# Patient Record
Sex: MALE | Race: WHITE | NOT HISPANIC OR LATINO | URBAN - METROPOLITAN AREA
[De-identification: names, ages, dates, MRNs, and addresses within clinical notes are randomized per-mention and may not be internally consistent; named-entity substitution may affect disease eponyms.]

---

## 2020-07-02 ENCOUNTER — OFFICE VISIT (OUTPATIENT)
Dept: URBAN - METROPOLITAN AREA TELEHEALTH 2 | Facility: TELEHEALTH | Age: 75
End: 2020-07-02
Payer: MEDICARE

## 2020-07-02 DIAGNOSIS — Z86.010 HISTORY OF COLON POLYPS: ICD-10-CM

## 2020-07-02 PROCEDURE — 993 PRE-COLON SCREENING (NON-BILLABLE): Performed by: INTERNAL MEDICINE

## 2020-07-02 PROCEDURE — 99242 OFF/OP CONSLTJ NEW/EST SF 20: CPT | Performed by: INTERNAL MEDICINE

## 2020-07-02 RX ORDER — ATORVASTATIN CALCIUM 80 MG/1
1 TABLET TABLET, FILM COATED ORAL ONCE A DAY
Status: ACTIVE | COMMUNITY

## 2020-07-02 RX ORDER — MELOXICAM 7.5 MG/1
TAKE 1 TABLET (7.5 MG) BY ORAL ROUTE ONCE DAILY TABLET ORAL 1
Qty: 0 | Refills: 0 | Status: ACTIVE | COMMUNITY
Start: 1900-01-01

## 2020-07-02 RX ORDER — METOPROLOL SUCCINATE 25 MG/1
1 TABLET TABLET, FILM COATED, EXTENDED RELEASE ORAL ONCE A DAY
Status: ACTIVE | COMMUNITY

## 2020-07-02 RX ORDER — SODIUM, POTASSIUM,MAG SULFATES 17.5-3.13G
254 ML SOLUTION, RECONSTITUTED, ORAL ORAL ONCE
Qty: 1 | Refills: 0 | OUTPATIENT
Start: 2020-07-04 | End: 2020-07-05

## 2020-07-02 RX ORDER — ALFUZOSIN HYDROCHLORIDE 10 MG/1
TAKE 1 TABLET (10 MG) BY ORAL ROUTE ONCE DAILY TABLET, EXTENDED RELEASE ORAL 1
Qty: 0 | Refills: 0 | Status: ACTIVE | COMMUNITY
Start: 1900-01-01

## 2020-07-02 RX ORDER — RAMIPRIL 5 MG/1
1 CAPSULE CAPSULE ORAL ONCE A DAY
Status: ACTIVE | COMMUNITY

## 2020-07-02 NOTE — HPI-TODAY'S VISIT:
12/8/16: The patient was asked to start iron prior to shoulder surgery planned for next week just to get his blood counts up but not due to anemia or iron deficiency.  When he started the oral iron he had worsening of chronic constipation and narrowing of the stools.  Since stopping the iron his stools are now moving better and are no longer dark.  He does take prunes and Miralax daily to go.  He denies abdominal pains, weight loss, night sweats, nausea or vomiting, rectal bleeding. The patient has a history of mutiple abdominal surgeries including AAA and anterior abdominal hernia repair x 4 (complicated by infection requiring drainage and later placement of nearly 12 inch square patch on the front of his abdomen). Four or five years ago he had an small bowel obstruction treated medically at [facility] due to adhesions.  June 2016 he had a RLQ colonic blockage treated medically. No old records are available for my review today. Records requested. About 18 months ago he had a colonoscopy in [facility] showing polyps that were removed and he was told to get another one in 2 years.  He reports multiple colonoscopies showing polyps in the past.  He has a FH of colon cancer in his father and sister. Patient had a normal stress test at [facility] recently.  7/2/20: Patient presents for follow-up of his colon polyps.  He is due for surveillance at this time.  No complaints.  Spent a while trying to update his medication list.

## 2020-07-04 ENCOUNTER — TELEPHONE ENCOUNTER (OUTPATIENT)
Dept: RURAL CLINIC 2 | Facility: CLINIC | Age: 75
End: 2020-07-04

## 2020-07-16 ENCOUNTER — OFFICE VISIT (OUTPATIENT)
Dept: RURAL CLINIC 2 | Facility: CLINIC | Age: 75
End: 2020-07-16

## 2020-08-03 ENCOUNTER — OFFICE VISIT (OUTPATIENT)
Dept: RURAL MEDICAL CENTER 5 | Facility: MEDICAL CENTER | Age: 75
End: 2020-08-03
Payer: MEDICARE

## 2020-08-03 DIAGNOSIS — Z86.010 H/O ADENOMATOUS POLYP OF COLON: ICD-10-CM

## 2020-08-03 DIAGNOSIS — K63.89 BACTERIAL OVERGROWTH SYNDROME: ICD-10-CM

## 2020-08-03 DIAGNOSIS — D12.3 ADENOMA OF TRANSVERSE COLON: ICD-10-CM

## 2020-08-03 PROCEDURE — 45385 COLONOSCOPY W/LESION REMOVAL: CPT | Performed by: INTERNAL MEDICINE

## 2020-08-03 PROCEDURE — 45380 COLONOSCOPY AND BIOPSY: CPT | Performed by: INTERNAL MEDICINE

## 2020-08-03 PROCEDURE — G9936 PMH PLYP/NEO CO/RECT/JUN/ANS: HCPCS | Performed by: INTERNAL MEDICINE

## 2021-07-27 ENCOUNTER — TELEPHONE ENCOUNTER (OUTPATIENT)
Dept: RURAL CLINIC 2 | Facility: CLINIC | Age: 76
End: 2021-07-27

## 2022-05-13 ENCOUNTER — OFFICE VISIT (OUTPATIENT)
Dept: RURAL CLINIC 2 | Facility: CLINIC | Age: 77
End: 2022-05-13

## 2022-05-13 RX ORDER — MELOXICAM 7.5 MG/1
TAKE 1 TABLET (7.5 MG) BY ORAL ROUTE ONCE DAILY TABLET ORAL 1
Qty: 0 | Refills: 0 | COMMUNITY
Start: 1900-01-01

## 2022-05-13 RX ORDER — RAMIPRIL 5 MG/1
1 CAPSULE CAPSULE ORAL ONCE A DAY
COMMUNITY

## 2022-05-13 RX ORDER — METOPROLOL SUCCINATE 25 MG/1
1 TABLET TABLET, FILM COATED, EXTENDED RELEASE ORAL ONCE A DAY
COMMUNITY

## 2022-05-13 RX ORDER — ALFUZOSIN HYDROCHLORIDE 10 MG/1
TAKE 1 TABLET (10 MG) BY ORAL ROUTE ONCE DAILY TABLET, EXTENDED RELEASE ORAL 1
Qty: 0 | Refills: 0 | COMMUNITY
Start: 1900-01-01

## 2022-05-13 RX ORDER — ATORVASTATIN CALCIUM 80 MG/1
1 TABLET TABLET, FILM COATED ORAL ONCE A DAY
COMMUNITY

## 2022-05-14 ENCOUNTER — OFFICE VISIT (OUTPATIENT)
Dept: RURAL CLINIC 2 | Facility: CLINIC | Age: 77
End: 2022-05-14
Payer: MEDICARE

## 2022-05-14 ENCOUNTER — DASHBOARD ENCOUNTERS (OUTPATIENT)
Age: 77
End: 2022-05-14

## 2022-05-14 DIAGNOSIS — I10 HTN (HYPERTENSION), BENIGN: ICD-10-CM

## 2022-05-14 DIAGNOSIS — Z80.0 FAMILY HISTORY OF COLON CANCER IN FATHER: ICD-10-CM

## 2022-05-14 DIAGNOSIS — E78.2 MIXED HYPERLIPIDEMIA: ICD-10-CM

## 2022-05-14 DIAGNOSIS — Z86.010 HISTORY OF COLON POLYPS: ICD-10-CM

## 2022-05-14 PROBLEM — 267434003: Status: ACTIVE | Noted: 2022-05-14

## 2022-05-14 PROBLEM — 10725009: Status: ACTIVE | Noted: 2022-05-14

## 2022-05-14 PROCEDURE — 99213 OFFICE O/P EST LOW 20 MIN: CPT | Performed by: INTERNAL MEDICINE

## 2022-05-14 PROCEDURE — 996 AG2 (NON BILLABLE): Performed by: INTERNAL MEDICINE

## 2022-05-14 RX ORDER — SODIUM, POTASSIUM,MAG SULFATES 17.5-3.13G
177 ML SOLUTION, RECONSTITUTED, ORAL ORAL
Qty: 1 KIT | Refills: 0 | OUTPATIENT
Start: 2022-05-14

## 2022-05-14 RX ORDER — ATORVASTATIN CALCIUM 80 MG/1
1 TABLET TABLET, FILM COATED ORAL ONCE A DAY
Status: ACTIVE | COMMUNITY

## 2022-05-14 RX ORDER — RAMIPRIL 5 MG/1
1 CAPSULE CAPSULE ORAL ONCE A DAY
Status: ACTIVE | COMMUNITY

## 2022-05-14 RX ORDER — MELOXICAM 7.5 MG/1
TAKE 1 TABLET (7.5 MG) BY ORAL ROUTE ONCE DAILY TABLET ORAL 1
Qty: 0 | Refills: 0 | Status: DISCONTINUED | COMMUNITY
Start: 1900-01-01

## 2022-05-14 RX ORDER — ALFUZOSIN HYDROCHLORIDE 10 MG/1
TAKE 1 TABLET (10 MG) BY ORAL ROUTE ONCE DAILY TABLET, EXTENDED RELEASE ORAL 1
Qty: 0 | Refills: 0 | Status: ACTIVE | COMMUNITY
Start: 1900-01-01

## 2022-05-14 RX ORDER — METOPROLOL SUCCINATE 25 MG/1
1 TABLET TABLET, FILM COATED, EXTENDED RELEASE ORAL ONCE A DAY
Status: ACTIVE | COMMUNITY

## 2022-05-14 RX ORDER — BISACODYL 5 MG
TAKE 4 TABLET, DELAYED RELEASE (ENTERIC COATED) ORAL
Qty: 4 | OUTPATIENT
Start: 2022-05-14 | End: 2022-05-15

## 2022-05-14 NOTE — HPI-TODAY'S VISIT:
12/8/16: The patient was asked to start iron prior to shoulder surgery planned for next week just to get his blood counts up but not due to anemia or iron deficiency.  When he started the oral iron he had worsening of chronic constipation and narrowing of the stools.  Since stopping the iron his stools are now moving better and are no longer dark.  He does take prunes and Miralax daily to go.  He denies abdominal pains, weight loss, night sweats, nausea or vomiting, rectal bleeding. The patient has a history of mutiple abdominal surgeries including AAA and anterior abdominal hernia repair x 4 (complicated by infection requiring drainage and later placement of nearly 12 inch square patch on the front of his abdomen). Four or five years ago he had an small bowel obstruction [place] [facility] [place] to [place]ions.  June 2016 he had a RLQ colonic blockage treated medically. No old records are available for my review today. Records requested. About 18 months ago he had a colonoscopy in [facility] showing polyps that were removed and he was told to get another one in 2 years.  He reports multiple colonoscopies showing polyps in the past.  He has a FH of colon cancer in his father and sister. Patient had a normal stress test at [facility] recently.  7/2/20: Patient presents for follow-up of his colon polyps.  He is due for surveillance at this time.  No complaints.  Spent a while trying to update his medication list. - 5/14/2022: he has been getting a colonoscopy every 2 years b/c his sister and his father both had cancer of the colon. last colonoscopy was in 8/2020 noting a few small polyps.

## 2022-05-16 ENCOUNTER — TELEPHONE ENCOUNTER (OUTPATIENT)
Dept: URBAN - METROPOLITAN AREA CLINIC 92 | Facility: CLINIC | Age: 77
End: 2022-05-16

## 2022-05-16 RX ORDER — POLYETHYLENE GLYCOL 3350, SODIUM SULFATE ANHYDROUS, SODIUM BICARBONATE, SODIUM CHLORIDE, POTASSIUM CHLORIDE 236; 22.74; 6.74; 5.86; 2.97 G/4L; G/4L; G/4L; G/4L; G/4L
AS DIRECTED POWDER, FOR SOLUTION ORAL ONCE
Qty: 1 KIT | Refills: 0 | OUTPATIENT
Start: 2022-05-16 | End: 2022-05-17

## 2022-06-28 PROBLEM — 428283002: Status: ACTIVE | Noted: 2020-07-04

## 2022-08-09 ENCOUNTER — OFFICE VISIT (OUTPATIENT)
Dept: RURAL MEDICAL CENTER 2 | Facility: MEDICAL CENTER | Age: 77
End: 2022-08-09
Payer: MEDICARE

## 2022-08-09 DIAGNOSIS — Z86.010 ADENOMAS PERSONAL HISTORY OF COLONIC POLYPS: ICD-10-CM

## 2022-08-09 DIAGNOSIS — Z80.0 BROTHER AT YOUNG AGE FAMILY HISTORY OF COLON CANCER: ICD-10-CM

## 2022-08-09 DIAGNOSIS — D12.3 ADENOMA OF TRANSVERSE COLON: ICD-10-CM

## 2022-08-09 PROCEDURE — 45385 COLONOSCOPY W/LESION REMOVAL: CPT | Performed by: INTERNAL MEDICINE

## 2022-08-09 RX ORDER — SODIUM, POTASSIUM,MAG SULFATES 17.5-3.13G
177 ML SOLUTION, RECONSTITUTED, ORAL ORAL
Qty: 1 KIT | Refills: 0 | Status: ACTIVE | COMMUNITY
Start: 2022-05-14

## 2022-08-09 RX ORDER — METOPROLOL SUCCINATE 25 MG/1
1 TABLET TABLET, FILM COATED, EXTENDED RELEASE ORAL ONCE A DAY
Status: ACTIVE | COMMUNITY

## 2022-08-09 RX ORDER — ATORVASTATIN CALCIUM 80 MG/1
1 TABLET TABLET, FILM COATED ORAL ONCE A DAY
Status: ACTIVE | COMMUNITY

## 2022-08-09 RX ORDER — PANTOPRAZOLE SODIUM 20 MG/1
1 TABLET TABLET, DELAYED RELEASE ORAL ONCE A DAY
Qty: 90 TABLET | Refills: 3 | OUTPATIENT
Start: 2022-08-09

## 2022-08-09 RX ORDER — RAMIPRIL 5 MG/1
1 CAPSULE CAPSULE ORAL ONCE A DAY
Status: ACTIVE | COMMUNITY

## 2022-08-09 RX ORDER — ALFUZOSIN HYDROCHLORIDE 10 MG/1
TAKE 1 TABLET (10 MG) BY ORAL ROUTE ONCE DAILY TABLET, EXTENDED RELEASE ORAL 1
Qty: 0 | Refills: 0 | Status: ACTIVE | COMMUNITY
Start: 1900-01-01